# Patient Record
Sex: MALE | URBAN - METROPOLITAN AREA
[De-identification: names, ages, dates, MRNs, and addresses within clinical notes are randomized per-mention and may not be internally consistent; named-entity substitution may affect disease eponyms.]

---
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
## 2020-10-19 ENCOUNTER — APPOINTMENT (OUTPATIENT)
Dept: ORTHOPEDIC SURGERY | Facility: CLINIC | Age: 20
End: 2020-10-19
Payer: OTHER GOVERNMENT

## 2020-10-19 VITALS — HEIGHT: 74 IN | WEIGHT: 215 LBS | BODY MASS INDEX: 27.59 KG/M2

## 2020-10-19 DIAGNOSIS — S93.602A UNSPECIFIED SPRAIN OF LEFT FOOT, INITIAL ENCOUNTER: ICD-10-CM

## 2020-10-19 DIAGNOSIS — Z78.9 OTHER SPECIFIED HEALTH STATUS: ICD-10-CM

## 2020-10-19 PROBLEM — Z00.00 ENCOUNTER FOR PREVENTIVE HEALTH EXAMINATION: Status: ACTIVE | Noted: 2020-10-19

## 2020-10-19 PROCEDURE — 99072 ADDL SUPL MATRL&STAF TM PHE: CPT

## 2020-10-19 PROCEDURE — 99203 OFFICE O/P NEW LOW 30 MIN: CPT

## 2020-10-19 PROCEDURE — 73630 X-RAY EXAM OF FOOT: CPT | Mod: LT

## 2020-10-19 NOTE — DISCUSSION/SUMMARY
[de-identified] : Paperwork for his school was filed out. \par Patient is advised to use an air-cast brace. \par \par NSAIDs as tolerated. \par Follow Up as needed.

## 2020-10-19 NOTE — ADDENDUM
[FreeTextEntry1] : I, Cynthia Lema wrote this note acting as a scribe for Dr. Dayne Clay on Oct 19, 2020.

## 2020-10-19 NOTE — END OF VISIT
[FreeTextEntry3] : I, Dayne Clay MD, ordering physician, have read and attest that all the information, medical decision making and discharge instructions within are true and accurate.

## 2020-10-19 NOTE — PHYSICAL EXAM
[de-identified] : Patient is WDWN, alert, and in no acute distress. Breathing is unlabored. He is grossly oriented to person, place, and time. \par \par Left foot:\par Cam walker brace is removed\par Tender over lateral collateral ligament.\par Mild pain with inversion [de-identified] : AP, lateral and oblique views of the left foot were obtained today and revealed o abnormalities.

## 2020-10-19 NOTE — HISTORY OF PRESENT ILLNESS
[de-identified] : CHICHI URENA presents to the office today for an initial Worker's Compensation evaluation involving the left foot after injuring it at work on 10/01/2020. Patient is a student at the [employer] .He was playing basketball when his teammate fell onto the foot. he states that his foot rolled.  No xrays were taken. He denies any pain, however he does have pain with certain movements. Denies pain medication.

## 2021-02-23 ENCOUNTER — NON-APPOINTMENT (OUTPATIENT)
Age: 21
End: 2021-02-23

## 2021-02-23 ENCOUNTER — APPOINTMENT (OUTPATIENT)
Dept: CARDIOLOGY | Facility: CLINIC | Age: 21
End: 2021-02-23
Payer: COMMERCIAL

## 2021-02-23 VITALS
BODY MASS INDEX: 25.67 KG/M2 | WEIGHT: 200 LBS | TEMPERATURE: 97.7 F | HEIGHT: 74 IN | DIASTOLIC BLOOD PRESSURE: 70 MMHG | HEART RATE: 48 BPM | SYSTOLIC BLOOD PRESSURE: 129 MMHG | OXYGEN SATURATION: 100 %

## 2021-02-23 DIAGNOSIS — U07.1 COVID-19: ICD-10-CM

## 2021-02-23 PROCEDURE — 93000 ELECTROCARDIOGRAM COMPLETE: CPT

## 2021-02-23 PROCEDURE — 99072 ADDL SUPL MATRL&STAF TM PHE: CPT

## 2021-02-23 PROCEDURE — 99243 OFF/OP CNSLTJ NEW/EST LOW 30: CPT

## 2021-02-23 PROCEDURE — 36415 COLL VENOUS BLD VENIPUNCTURE: CPT

## 2021-02-23 NOTE — REASON FOR VISIT
[FreeTextEntry1] : 20-year-old from the [employer] recently positive for COVID-19

## 2021-02-23 NOTE — PHYSICAL EXAM
[General Appearance - Well Developed] : well developed [Normal Appearance] : normal appearance [Well Groomed] : well groomed [General Appearance - Well Nourished] : well nourished [No Deformities] : no deformities [General Appearance - In No Acute Distress] : no acute distress [Normal Conjunctiva] : the conjunctiva exhibited no abnormalities [Eyelids - No Xanthelasma] : the eyelids demonstrated no xanthelasmas [Normal Oral Mucosa] : normal oral mucosa [No Oral Pallor] : no oral pallor [No Oral Cyanosis] : no oral cyanosis [Normal Jugular Venous A Waves Present] : normal jugular venous A waves present [Normal Jugular Venous V Waves Present] : normal jugular venous V waves present [No Jugular Venous Jones A Waves] : no jugular venous jones A waves [Heart Rate And Rhythm] : heart rate and rhythm were normal [Heart Sounds] : normal S1 and S2 [Murmurs] : no murmurs present [Respiration, Rhythm And Depth] : normal respiratory rhythm and effort [Auscultation Breath Sounds / Voice Sounds] : lungs were clear to auscultation bilaterally [Exaggerated Use Of Accessory Muscles For Inspiration] : no accessory muscle use [Abdomen Soft] : soft [Abdomen Tenderness] : non-tender [Abdomen Mass (___ Cm)] : no abdominal mass palpated [Abnormal Walk] : normal gait [Gait - Sufficient For Exercise Testing] : the gait was sufficient for exercise testing [Nail Clubbing] : no clubbing of the fingernails [Cyanosis, Localized] : no localized cyanosis [Petechial Hemorrhages (___cm)] : no petechial hemorrhages [Skin Color & Pigmentation] : normal skin color and pigmentation [] : no rash [No Venous Stasis] : no venous stasis [No Skin Ulcers] : no skin ulcer [Skin Lesions] : no skin lesions [No Xanthoma] : no  xanthoma was observed [Affect] : the affect was normal [Oriented To Time, Place, And Person] : oriented to person, place, and time [Mood] : the mood was normal [No Anxiety] : not feeling anxious [FreeTextEntry1] : No edema.  Pulses 2+ bilaterally symmetric including pedal

## 2021-02-23 NOTE — HISTORY OF PRESENT ILLNESS
[FreeTextEntry1] : February 23, 2021.  Patient is 20 years old and healthy, in the [employer].  A whole basketball team was tested for COVID-19.  A few days after the test the patient lost his taste and smell and this lasted for about 2 days and then came back to normal.  He denies other Covid symptoms.  The Covid test came back positive and he is now here for evaluation about returning to full activity.  No prior cardiac history.  No history of syncope or heart murmur.  No family history of cardiac disease or arrhythmias.  He does not smoke or do drugs.  A little alcohol occasionally.  Only past medical history was an umbilical hernia repaired when he was extremely young.

## 2021-02-23 NOTE — REVIEW OF SYSTEMS
[see HPI] : see HPI [Negative] : Heme/Lymph [Fever] : no fever [Recent Weight Gain (___ Lbs)] : no recent weight gain [Feeling Fatigued] : not feeling fatigued [Recent Weight Loss (___ Lbs)] : no recent weight loss [Shortness Of Breath] : no shortness of breath [Dyspnea on exertion] : not dyspnea during exertion [Chest  Pressure] : no chest pressure [Chest Pain] : no chest pain [Palpitations] : no palpitations [Dizziness] : no dizziness [Anxiety] : no anxiety [Under Stress] : not under stress

## 2021-02-24 LAB
NT-PROBNP SERPL-MCNC: 29 PG/ML
TROPONIN I SERPL-MCNC: <0.01 NG/ML

## 2021-03-03 ENCOUNTER — OUTPATIENT (OUTPATIENT)
Dept: OUTPATIENT SERVICES | Facility: HOSPITAL | Age: 21
LOS: 1 days | End: 2021-03-03
Payer: COMMERCIAL

## 2021-03-03 ENCOUNTER — NON-APPOINTMENT (OUTPATIENT)
Age: 21
End: 2021-03-03

## 2021-03-03 ENCOUNTER — APPOINTMENT (OUTPATIENT)
Dept: CV DIAGNOSTICS | Facility: HOSPITAL | Age: 21
End: 2021-03-03

## 2021-03-03 DIAGNOSIS — Z98.890 OTHER SPECIFIED POSTPROCEDURAL STATES: Chronic | ICD-10-CM

## 2021-03-03 DIAGNOSIS — U07.1 COVID-19: ICD-10-CM

## 2021-03-03 PROCEDURE — 93016 CV STRESS TEST SUPVJ ONLY: CPT

## 2021-03-03 PROCEDURE — 93017 CV STRESS TEST TRACING ONLY: CPT

## 2021-03-03 PROCEDURE — 93018 CV STRESS TEST I&R ONLY: CPT

## 2021-04-19 ENCOUNTER — APPOINTMENT (OUTPATIENT)
Dept: CARDIOLOGY | Facility: CLINIC | Age: 21
End: 2021-04-19

## 2021-06-02 NOTE — H&P PST ADULT - ADMIT DATE
Seen for: nutrition follow up  Source: RN, EMR    Chart reviewed, events noted. 76F with CHFpEF, Severe TR with Right CHF and severe pHTN, COPD on home 02, HTN, HLD, Chronic Afib (on Eliquis), OA, CKD3, DM, presenting with acute RV failure and hypercarbic respiratory failure req intubation c/b hemorrhagic shock requiring MTP and now further c/b AMS. EEG ordered.    Diet Order:   Diet, NPO with Tube Feed:   Tube Feeding Modality: Orogastric  Nepro with Carb Steady (NEPRORTH)  Total Volume for 24 Hours (mL): 960  Continuous  Starting Tube Feed Rate {mL per Hour}: 10  Increase Tube Feed Rate by (mL): 10     Every 4 hours  Until Goal Tube Feed Rate (mL per Hour): 40  Tube Feed Duration (in Hours): 24  Tube Feed Start Time: 05:00  No Carb Prosource (1pkg = 15gms Protein)     Qty per Day:  1  Supplement Feeding Modality:  Orogastric (21)    EN Order Provides: 1768 kcal, 89 g protein (15.7 kcal/kg using dosing weight 112.4 kg, 1.6 g/kg protein using IBW 56.6 kg)    EN provision per chart: met goal rate on  and has been receiving >80% of goal since, held now for possible extubation trials    - Receiving free water 250 ml q6 hours; Na 150 today  - Using Nepro formula for NIC on CKD/hyperphosphatemia; today K WNL, Phos low  - Tube feeds off for CPAP trials per RN    GI: Received lactulose yesterday for possible metabolic encephalopathy. Rectal tube placed, noted with 1 BM overnight per RN/flowsheets. Lactulose since discontinued. No apparent bowel regimen at this time.    Weights:   Daily Weight in k.8 (), Weight in k.6 (), Weight in k.5 () - Weight loss noted, noted bumex gtt    MEDICATIONS  (STANDING):  atorvastatin  buMETAnide Infusion  norepinephrine Infusion  pantoprazole  Injectable  sodium chloride 0.9%.    Pertinent Labs:  @ 00:55: Na 150<H>, BUN 75<H>, Cr 3.02<H>, <H>, K+ 3.6, Phos 2.0<L>, Mg 2.2, Alk Phos 215<H>, ALT/SGPT 22, AST/SGOT 40, HbA1c --   @ 16:08: Na 149<H>, BUN 73<H>, Cr 2.95<H>, <H>, K+ 3.9, Phos 2.7, Mg 2.2, Alk Phos 193<H>, ALT/SGPT 22, AST/SGOT 36, HbA1c --    A1C with Estimated Average Glucose Result: 5.3 % (21 @ 17:05)    Skin per chart: sacral pressure injury stage II  Edema per chart: () 1+ generalized    Estimated Nutrition Needs:   - Considering vent/BMI >40  - Energy Needs (15-20 kcal/kg): 0896-2272 kcal/day - using dosing weight 112.4kg  - Protein Needs (1.4-1.8 g/kg):  g/day - using IBW 56.6 kg  - Fluid Needs: per team    Previous Nutrition Diagnoses  1) Overweight/obesity  2) Increased nutrient needs 2/2 pressure injury  - Both ongoing and addressed with tube feeds    New Nutrition Diagnosis: none at this time       Recommendations:      1) Continue tube feeds as ordered, meet estimated nutrient needs at goal rate    - If hypophosphatemia persists/renal function continues to improve, consider switching to Jevity 1.2 @ 70 ml/hr x 24 hours. At goal, provides 1680 ml formula, 2016 kcal, 93 g protein (meeting 17.9 kcal/kg using dosing weight 112.4 kg, 1.6 g/kg protein using IBW 56.6 kg). - Of note, this regimen would provide 1356 ml free water (additional +658 ml free water than current regimen).   2) Free water per medical team    Continue monitoring and evaluating:   - Labs: blood glucose, electrolytes, renal indices  - GI function and bowel movements  - Nutritional intake, weight trends, skin integrity    RD remains available PRN and will follow up per protocol.   Rani Henriquez RD, CDN, [facility]   Pager # [phone] 08-Dec-2017

## 2024-05-23 RX ORDER — FAMOTIDINE 10 MG/ML
1 INJECTION INTRAVENOUS
Qty: 0 | Refills: 0 | DISCHARGE

## 2025-01-13 NOTE — ASU PATIENT PROFILE, ADULT - MENTAL HEALTH CONDITIONS/SYMPTOMS, PROFILE
none MEDICATIONS  (STANDING):  acetaminophen     Tablet .. 975 milliGRAM(s) Oral every 6 hours  ascorbic acid 500 milliGRAM(s) Oral two times a day  aspirin enteric coated 325 milliGRAM(s) Oral daily  atorvastatin 80 milliGRAM(s) Oral at bedtime  bisacodyl Suppository 10 milliGRAM(s) Rectal once  chlorhexidine 4% Liquid 1 Application(s) Topical <User Schedule>  dextrose 50% Injectable 25 milliLiter(s) IV Push every 15 minutes  enoxaparin Injectable 40 milliGRAM(s) SubCutaneous every 24 hours  ezetimibe 10 milliGRAM(s) Oral daily  furosemide    Tablet 40 milliGRAM(s) Oral daily  gabapentin 100 milliGRAM(s) Oral every 8 hours  glucagon  Injectable 1 milliGRAM(s) IntraMuscular once  insulin lispro (ADMELOG) corrective regimen sliding scale   SubCutaneous three times a day before meals  methocarbamol 750 milliGRAM(s) Oral three times a day  metoprolol tartrate 12.5 milliGRAM(s) Oral two times a day  pantoprazole    Tablet 40 milliGRAM(s) Oral daily  polyethylene glycol 3350 17 Gram(s) Oral daily  potassium chloride   Powder 40 milliEquivalent(s) Oral once  senna 2 Tablet(s) Oral at bedtime    MEDICATIONS  (PRN):  acetaminophen     Tablet .. 650 milliGRAM(s) Oral every 6 hours PRN Mild Pain (1 - 3)  dextrose Oral Gel 15 Gram(s) Oral once PRN Blood Glucose LESS THAN 70 milliGRAM(s)/deciliter  oxyCODONE    IR 10 milliGRAM(s) Oral every 4 hours PRN Severe Pain (7 - 10)  sodium chloride 0.9% lock flush 10 milliLiter(s) IV Push every 1 hour PRN Pre/post blood products, medications, blood draw, and to maintain line patency